# Patient Record
Sex: MALE | Race: WHITE | Employment: PART TIME | URBAN - METROPOLITAN AREA
[De-identification: names, ages, dates, MRNs, and addresses within clinical notes are randomized per-mention and may not be internally consistent; named-entity substitution may affect disease eponyms.]

---

## 2021-10-25 ENCOUNTER — OFFICE VISIT - RIVER FALLS (OUTPATIENT)
Dept: FAMILY MEDICINE | Facility: CLINIC | Age: 26
End: 2021-10-25

## 2021-10-25 ASSESSMENT — MIFFLIN-ST. JEOR: SCORE: 1723.35

## 2021-10-27 ENCOUNTER — COMMUNICATION - RIVER FALLS (OUTPATIENT)
Dept: FAMILY MEDICINE | Facility: CLINIC | Age: 26
End: 2021-10-27

## 2021-10-27 LAB
6-ACETYL MORPHINE - HISTORICAL: NEGATIVE NG/ML
AMPHETAMINE - HISTORICAL: 1680 NG/ML
AMPHETAMINE URINE - HISTORICAL: ABNORMAL
AMPHETAMINES UR QL: POSITIVE NG/ML
BENZODIAZ UR QL SCN: NEGATIVE NG/ML
BUPRENORPHINE: NEGATIVE NG/ML
CANNABINOIDS QUAL UR: 35 NG/ML
COCAINE METAB URINE - HISTORICAL: NEGATIVE NG/ML
CREAT UR-MCNC: 61.6 MG/DL
ETHYL GLUCURONIDE UR QL CFM: NEGATIVE NG/ML
MDMA: NEGATIVE NG/ML
METHAMPHETAMINE,URINE - HISTORICAL: NEGATIVE NG/ML
OPIATES UR QL SCN: NEGATIVE NG/ML
OXIDANTS URINE: NEGATIVE MCG/ML
OXYCODONE URINE - HISTORICAL: NEGATIVE NG/ML
PH UR STRIP: 5.2 [PH] (ref 4.5–9)
THC 50 URINE - HISTORICAL: POSITIVE NG/ML

## 2021-10-28 ENCOUNTER — COMMUNICATION - RIVER FALLS (OUTPATIENT)
Dept: FAMILY MEDICINE | Facility: CLINIC | Age: 26
End: 2021-10-28

## 2021-11-04 ENCOUNTER — VIRTUAL VISIT (OUTPATIENT)
Dept: BEHAVIORAL HEALTH | Facility: TELEHEALTH | Age: 26
End: 2021-11-04
Payer: COMMERCIAL

## 2021-11-04 DIAGNOSIS — F32.A DEPRESSION, UNSPECIFIED DEPRESSION TYPE: Primary | ICD-10-CM

## 2021-11-04 PROCEDURE — 90832 PSYTX W PT 30 MINUTES: CPT | Mod: 95 | Performed by: SOCIAL WORKER

## 2021-11-04 ASSESSMENT — ANXIETY QUESTIONNAIRES
7. FEELING AFRAID AS IF SOMETHING AWFUL MIGHT HAPPEN: SEVERAL DAYS
GAD7 TOTAL SCORE: 10
2. NOT BEING ABLE TO STOP OR CONTROL WORRYING: SEVERAL DAYS
1. FEELING NERVOUS, ANXIOUS, OR ON EDGE: MORE THAN HALF THE DAYS
3. WORRYING TOO MUCH ABOUT DIFFERENT THINGS: MORE THAN HALF THE DAYS
GAD7 TOTAL SCORE: 10
6. BECOMING EASILY ANNOYED OR IRRITABLE: SEVERAL DAYS
GAD7 TOTAL SCORE: 10
7. FEELING AFRAID AS IF SOMETHING AWFUL MIGHT HAPPEN: SEVERAL DAYS
4. TROUBLE RELAXING: MORE THAN HALF THE DAYS
8. IF YOU CHECKED OFF ANY PROBLEMS, HOW DIFFICULT HAVE THESE MADE IT FOR YOU TO DO YOUR WORK, TAKE CARE OF THINGS AT HOME, OR GET ALONG WITH OTHER PEOPLE?: SOMEWHAT DIFFICULT
5. BEING SO RESTLESS THAT IT IS HARD TO SIT STILL: SEVERAL DAYS

## 2021-11-04 ASSESSMENT — PATIENT HEALTH QUESTIONNAIRE - PHQ9
SUM OF ALL RESPONSES TO PHQ QUESTIONS 1-9: 19
10. IF YOU CHECKED OFF ANY PROBLEMS, HOW DIFFICULT HAVE THESE PROBLEMS MADE IT FOR YOU TO DO YOUR WORK, TAKE CARE OF THINGS AT HOME, OR GET ALONG WITH OTHER PEOPLE: VERY DIFFICULT
SUM OF ALL RESPONSES TO PHQ QUESTIONS 1-9: 19

## 2021-11-05 ASSESSMENT — ANXIETY QUESTIONNAIRES: GAD7 TOTAL SCORE: 10

## 2021-11-05 ASSESSMENT — PATIENT HEALTH QUESTIONNAIRE - PHQ9: SUM OF ALL RESPONSES TO PHQ QUESTIONS 1-9: 19

## 2021-11-05 NOTE — PROGRESS NOTES
[facility]: : Integrated Behavioral Health  November 5, 2021    Telemedicine Visit: The patient's condition can be safely assessed and treated via synchronous audio and visual telemedicine encounter.     Reason for Telemedicine Visit: Services only offered telehealth    Originating Site (Patient Location): Patient's home    Distant Site (Provider Location): Provider Remote Setting- Home Office    Consent:  The patient/guardian has verbally consented to: the potential risks and benefits of telemedicine (video visit) versus in person care; bill my insurance or make self-payment for services provided; and responsibility for payment of non-covered services.     Mode of Communication:  Video Conference via [employer]    As the provider I attest to compliance with applicable laws and regulations related to telemedicine.      Behavioral Health Clinician Progress Note    Patient Name: Regino Goldstein           Service Type:  Individual      Service Location:   Face to Face in Home / Community via [employer]     Session Start Time: 8:31am  Session End Time: 8:58am      Session Length: 16 - 37      Attendees: Patient    Visit Activities (Refresh list every visit): [facility] and [facility] Only    Diagnostic Assessment Date: Next Session  Treatment Plan Review Date: NA  See Flowsheets for today's PHQ-9 and NIKO-7 results  Previous PHQ-9:   PHQ-9 SCORE 11/4/2021   PHQ-9 Total Score MyChart 19 (Moderately severe depression)   PHQ-9 Total Score 19     Previous NIKO-7:   NIKO-7 SCORE 11/4/2021   Total Score 10 (moderate anxiety)   Total Score 10       GIL LEVEL:  No flowsheet data found.    DATA  Extended Session (60+ minutes): No  Interactive Complexity: No  Crisis: No  [facility] Patient: No    Treatment Objective(s) Addressed in This Session:  Target Behavior(s): disease management/lifestyle changes related to mental health    Depressed Mood: Increase interest, engagement, and pleasure in doing  "things  Decrease frequency and intensity of feeling down, depressed, hopeless  Improve quantity and quality of night time sleep / decrease daytime naps  Feel less tired and more energy during the day   Improve diet, appetite, mindful eating, and / or meal planning  Identify negative self-talk and behaviors: challenge core beliefs, myths, and actions  Improve concentration, focus, and mindfulness in daily activities   Feel less fidgety, restless or slow in daily activities / interpersonal interactions  Decrease thoughts that you'd be better off dead or of suicide / self-harm  Anxiety: will experience a reduction in anxiety, will develop more effective coping skills to manage anxiety symptoms, will develop healthy cognitive patterns and beliefs and will increase ability to function adaptively  Adjustment Difficulties: will develop coping/problem-solving skills to facilitate more adaptive adjustment    Current Stressors / Issues:  [facility] introduced self and role to patient. Patient reported that he has \"always been depressed\" that stems from anxiety that often spirals. Patient shared that his symptoms were really bad in college where he wasn't taking care of himself and wouldn't go to class. He noted having social anxiety starting in 4th grade. This past July he decided to quit his job of 3 years and move back home after living in [place] for many years. 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 Patient reported that since he has made the move home he has noticed that he is sleeping more than normal and it is hard to get motivated. Patient reported that he has experienced passive SI lots of his life with no plan or intent and no " history of plan or intent. Patient is not concerned with his thoughts, as he would never do anything. C and patient spent session processing through his symptoms and the changes. [facility] encouraged patient to try to get back into a routine even though he doesn't have day to day responsibilities.       Progress on Treatment Objective(s) / Homework:  New Objective established this session - PREPARATION (Decided to change - considering how); Intervened by negotiating a change plan and determining options / strategies for behavior change, identifying triggers, exploring social supports, and working towards setting a date to begin behavior change    Motivational Interviewing    MI Intervention: Expressed Empathy/Understanding, Supported Autonomy, Collaboration, Evocation, Permission to raise concern or advise, Open-ended questions and Reflections: simple and complex     Change Talk Expressed by the Patient: Desire to change Ability to change Reasons to change Need to change Committment to change Activation Taking steps    Provider Response to Change Talk: E - Evoked more info from patient about behavior change, A - Affirmed patient's thoughts, decisions, or attempts at behavior change, R - Reflected patient's change talk and S - Summarized patient's change talk statements    Also provided psychoeducation about behavioral health condition, symptoms, and treatment options    Care Plan review completed: Yes    Medication Review:  No changes to current psychiatric medication(s)    Medication Compliance:  Yes    Changes in Health Issues:   None reported    Chemical Use Review:   Substance Use: Chemical use reviewed, no active concerns identified      Tobacco Use: No current tobacco use.      Assessment: Current Emotional / Mental Status (status of significant symptoms):  Risk status (Self / Other harm or suicidal ideation)  Patient has had a history of suicidal ideation: fleeting, passive SI.  Patient denies current fears or  concerns for personal safety.  Patient denies current or recent suicidal ideation or behaviors.  Patient denies current or recent homicidal ideation or behaviors.  Patient denies current or recent self injurious behavior or ideation.  Patient denies other safety concerns.  A safety and risk management plan has not been developed at this time, however patient was encouraged to call [place] [phone] should there be a change in any of these risk factors.    Appearance:   Appropriate   Eye Contact:   Good   Psychomotor Behavior: Normal   Attitude:   Cooperative   Orientation:   All  Speech   Rate / Production: Normal    Volume:  Normal   Mood:    Normal  Affect:    Appropriate   Thought Content:  Clear   Thought Form:  Coherent  Logical   Insight:    Good     Diagnoses:  1. Depression, unspecified depression type        Collateral Reports Completed:  Routed note to PCP    Plan: (Homework, other):  Patient was given information about behavioral services and encouraged to schedule a follow up appointment with the clinic [facility] as needed.  He was also given information about mental health symptoms and treatment options .  CD Recommendations: No indications of CD issues. DA to be completed at next session. LIZZETTE Taylor, [facility]      ______________________________________________________________________

## 2021-11-16 ENCOUNTER — VIRTUAL VISIT (OUTPATIENT)
Dept: BEHAVIORAL HEALTH | Facility: TELEHEALTH | Age: 26
End: 2021-11-16
Payer: COMMERCIAL

## 2021-11-16 DIAGNOSIS — F33.1 MODERATE EPISODE OF RECURRENT MAJOR DEPRESSIVE DISORDER (H): Primary | ICD-10-CM

## 2021-11-16 DIAGNOSIS — F41.1 GENERALIZED ANXIETY DISORDER: ICD-10-CM

## 2021-11-16 PROCEDURE — 90791 PSYCH DIAGNOSTIC EVALUATION: CPT | Mod: 95 | Performed by: SOCIAL WORKER

## 2021-11-16 ASSESSMENT — COLUMBIA-SUICIDE SEVERITY RATING SCALE - C-SSRS
5. HAVE YOU STARTED TO WORK OUT OR WORKED OUT THE DETAILS OF HOW TO KILL YOURSELF? DO YOU INTEND TO CARRY OUT THIS PLAN?: NO
6. HAVE YOU EVER DONE ANYTHING, STARTED TO DO ANYTHING, OR PREPARED TO DO ANYTHING TO END YOUR LIFE?: NO
4. HAVE YOU HAD THESE THOUGHTS AND HAD SOME INTENTION OF ACTING ON THEM?: NO
4. HAVE YOU HAD THESE THOUGHTS AND HAD SOME INTENTION OF ACTING ON THEM?: NO
ATTEMPT LIFETIME: NO
6. HAVE YOU EVER DONE ANYTHING, STARTED TO DO ANYTHING, OR PREPARED TO DO ANYTHING TO END YOUR LIFE?: NO
2. HAVE YOU ACTUALLY HAD ANY THOUGHTS OF KILLING YOURSELF?: NO
ATTEMPT PAST THREE MONTHS: NO
TOTAL  NUMBER OF INTERRUPTED ATTEMPTS PAST 3 MONTHS: NO
TOTAL  NUMBER OF ABORTED OR SELF INTERRUPTED ATTEMPTS PAST LIFETIME: NO
1. IN THE PAST MONTH, HAVE YOU WISHED YOU WERE DEAD OR WISHED YOU COULD GO TO SLEEP AND NOT WAKE UP?: YES
TOTAL  NUMBER OF ABORTED OR SELF INTERRUPTED ATTEMPTS PAST 3 MONTHS: NO
5. HAVE YOU STARTED TO WORK OUT OR WORKED OUT THE DETAILS OF HOW TO KILL YOURSELF? DO YOU INTEND TO CARRY OUT THIS PLAN?: NO
1. IN THE PAST MONTH, HAVE YOU WISHED YOU WERE DEAD OR WISHED YOU COULD GO TO SLEEP AND NOT WAKE UP?: YES
3. HAVE YOU BEEN THINKING ABOUT HOW YOU MIGHT KILL YOURSELF?: YES
TOTAL  NUMBER OF INTERRUPTED ATTEMPTS LIFETIME: NO
2. HAVE YOU ACTUALLY HAD ANY THOUGHTS OF KILLING YOURSELF LIFETIME?: YES
REASONS FOR IDEATION PAST MONTH: MOSTLY TO END OR STOP THE PAIN (YOU COULDN'T GO ON LIVING WITH THE PAIN OR HOW YOU WERE FEELING)
REASONS FOR IDEATION LIFETIME: MOSTLY TO END OR STOP THE PAIN (YOU COULDN'T GO ON LIVING WITH THE PAIN OR HOW YOU WERE FEELING)
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
## 2021-11-30 ENCOUNTER — THERAPY VISIT (OUTPATIENT)
Dept: BEHAVIORAL HEALTH | Facility: TELEHEALTH | Age: 26
End: 2021-11-30
Payer: COMMERCIAL

## 2021-11-30 DIAGNOSIS — F33.1 MODERATE EPISODE OF RECURRENT MAJOR DEPRESSIVE DISORDER (H): ICD-10-CM

## 2021-11-30 DIAGNOSIS — F41.1 GENERALIZED ANXIETY DISORDER: ICD-10-CM

## 2021-11-30 PROCEDURE — 90834 PSYTX W PT 45 MINUTES: CPT | Performed by: MARRIAGE & FAMILY THERAPIST

## 2021-11-30 NOTE — PROGRESS NOTES
Progress Note    Patient Name: Regino Goldstein  Date: 11/30/2021         Service Type: Individual      Session Start Time: 11 AM   Session End Time: 11:50 AM     Session Length: 50 minutes    Session #: 3    Attendees: Client    Service Modality:  In-person     Treatment Plan Last Reviewed: 11/30/2021  PHQ-9 / NIKO-7 : Recent scores 19/10    DATA  Interactive Complexity: No  Crisis: No       Progress Since Last Session (Related to Symptoms / Goals / Homework):   Symptoms: No change First session with this provider    Homework: NA      Episode of Care Goals: No improvement - PREPARATION (Decided to change - considering how); Intervened by negotiating a change plan and determining options / strategies for behavior change, identifying triggers, exploring social supports, and working towards setting a date to begin behavior change     Current / Ongoing Stressors and Concerns:         New visit between provider and this client. He discussed his anxiety as daily, depressive symtoms daily, and a long history of ADHD, Combined Type. Goals were discussed below. He provided a brief summar of background information leading to his referral for behavioral health. He is taking Adderrall and has discontinued Lexapro. He is reporting less suicidal thoughts, and had not had any since his visit with Ladonna. He discussed liking logical approaches, so CBT was introduced. He was provided handout for Depression Treatment with CBT from Vincent rosas. He is assigned to complete a nightly success list, and to use a mood log when having any depressive or anxious symptom that troubles him. Weekly visits were agreed upon.       Treatment Objective(s) Addressed in This Session:   use thought-stopping strategy daily to reduce intrusive thoughts  Replace negative interpretations of self/others/world and replace with rational responses.     Intervention:   CBT:    use thought-stopping  strategy daily to reduce intrusive thoughts  Replace negative interpretations of self/others/world and replace with rational responses.          ASSESSMENT: Current Emotional / Mental Status (status of significant symptoms):   Risk status (Self / Other harm or suicidal ideation)   Patient denies current fears or concerns for personal safety.   Patient denies current or recent suicidal ideation or behaviors.   Patient denies current or recent homicidal ideation or behaviors.   Patient denies current or recent self injurious behavior or ideation.   Patient denies other safety concerns.   Patient reports there has been no change in risk factors since their last session.     Patient reports there has been no change in protective factors since their last session.     Recommended that patient call 911 or go to the local ED should there be a change in any of these risk factors.     Appearance:   Appropriate    Eye Contact:   Good    Psychomotor Behavior: Normal    Attitude:   Cooperative  Friendly   Orientation:   All   Speech    Rate / Production: Normal     Volume:  Normal    Mood:    Anxious  Depressed    Affect:    Appropriate    Thought Content:  Clear    Thought Form:  Coherent  Logical    Insight:    Good      Medication Review:   No changes to current psychiatric medication(s)     Medication Compliance:   Yes     Changes in Health Issues:   None reported     Chemical Use Review:   Substance Use: Chemical use reviewed, no active concerns identified      Tobacco Use: No current tobacco use.      Diagnosis:  1. Generalized anxiety disorder    2. Moderate episode of recurrent major depressive disorder (H)        Collateral Reports Completed:   Not Applicable    PLAN: (Patient Tasks / Therapist Tasks / Other)  Client will complete success list nightly  Client will use mood log during anxious or depressive symptoms.        CINTHIA Smith                                                          ______________________________________________________________________    Treatment Plan    Patient's Name: Regino Goldstein  YOB: 1995    Date: 11/30/2021    DSM5 Diagnoses: 300.02 (F41.1) Generalized Anxiety Disorder ; Major Depressive Disorder, Recurrent, Moderate, ADHD Combined    Psychosocial / Contextual Factors: living with parents, learning a new job, history of depression, ADHD   WHODAS: See file screening    Referral / Collaboration:  Referral to another professional/service is not indicated at this time..    Anticipated number of session or this episode of care: 20      MeasurableTreatment Goal(s) related to diagnosis / functional impairment(s)  Goal 1: Patient will Develop healthy cognitive patterns and beliefs about self and world that lead to alleviation and help prevent recurrance of anxiety and depression.    I will know I've met my goal when I am happier.      Objective #A (Patient Action)    Patient will use thought-stopping strategy daily to reduce intrusive thoughts.  Status: New - Date: 11/30/2021     Intervention(s)  Therapist will teach Cognitive behavioral techniques and mindfulness strategies to disrutp anxios patterns.    Objective #B  Patient will Decrease frequency and intensity of feeling down, depressed, hopeless.  Status: New - Date: 11/30/2021     Intervention(s)  Therapist will teach   teach Cognitive behavioral techniques and mindfulness strategies to disrutp anxios patterns        Patient has reviewed and agreed to the above plan.      CINTHIA Smith  November 30, 2021

## 2021-12-08 ENCOUNTER — OFFICE VISIT (OUTPATIENT)
Dept: BEHAVIORAL HEALTH | Facility: TELEHEALTH | Age: 26
End: 2021-12-08
Payer: COMMERCIAL

## 2021-12-08 DIAGNOSIS — F41.1 GENERALIZED ANXIETY DISORDER: ICD-10-CM

## 2021-12-08 DIAGNOSIS — F90.9 ATTENTION DEFICIT HYPERACTIVITY DISORDER (ADHD), UNSPECIFIED ADHD TYPE: ICD-10-CM

## 2021-12-08 DIAGNOSIS — F33.1 MODERATE EPISODE OF RECURRENT MAJOR DEPRESSIVE DISORDER (H): Primary | ICD-10-CM

## 2021-12-08 PROCEDURE — 90834 PSYTX W PT 45 MINUTES: CPT | Performed by: MARRIAGE & FAMILY THERAPIST

## 2021-12-08 NOTE — PROGRESS NOTES
"                                             Progress Note    Patient Name: Regino Goldstein  Date: 12/8/2021         Service Type: Individual      Session Start Time: 9:05 AM  session End Time: 9:55 AM     Session Length: 50 minutes    Session #: 2    Attendees: Client    Service Modality:  In-person     Treatment Plan Last Reviewed: 12/8/2021  PHQ-9 / NIKO-7 :   PHQ-9 score:    PHQ 11/4/2021   PHQ-9 Total Score 19   Q9: Thoughts of better off dead/self-harm past 2 weeks Several days   F/U: Thoughts of suicide or self-harm Yes   F/U: Self harm-plan No   F/U: Self-harm action No   F/U: Safety concerns No       NIKO-7 SCORE 11/4/2021   Total Score 10 (moderate anxiety)   Total Score 10       DATA  Interactive Complexity: No  Crisis: No       Progress Since Last Session (Related to Symptoms / Goals / Homework):   Symptoms: Improving Client reported no suicidal ideation over the past week    Homework: No homework as assigned from the assessment      Episode of Care Goals: Minimal progress - PREPARATION (Decided to change - considering how); Intervened by negotiating a change plan and determining options / strategies for behavior change, identifying triggers, exploring social supports, and working towards setting a date to begin behavior change     Current / Ongoing Stressors and Concerns:   Client discussed having no suicidal ideation over the past week.  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  He discussed feeling excluded and disconnected from multiple social groups due to race and other factors.  Client states \"I cannot connect.\"  Therapist utilized solution oriented interventions to highlight that a keyword would be yet.  He responded favorably to this.  He then discussed conflict with family over them sending solutions for their thoughts of his career goals.  He states that he sacrificed independence and autonomy by moving back to his family " residence.  He stated that he is highly aware of other peoples failure to accept or acknowledge race.  Conflict with father includes messages of not mattering to others.  This was pursued further to highlight cognitive distortions and process rational responses, that matters to the client what he does and where he goes so as to feel respected and the experience of wanting to invest himself within the community.     Treatment Objective(s) Addressed in This Session:   use cognitive strategies identified in therapy to challenge anxious thoughts  Increase interest, engagement, and pleasure in doing things  Decrease frequency and intensity of feeling down, depressed, hopeless  Identify negative self-talk and behaviors: challenge core beliefs, myths, and actions  Feel less fidgety, restless or slow in daily activities / interpersonal interactions  Decrease thoughts that you'd be better off dead or of suicide / self-harm     Intervention:   CBT: Distortion identification and replacement  Solution Focused: Focus on solutions that are self identified        ASSESSMENT: Current Emotional / Mental Status (status of significant symptoms):   Risk status (Self / Other harm or suicidal ideation)   Patient denies current fears or concerns for personal safety.   Patient reports the following current or recent suicidal ideation or behaviors: More than a week ago he had daily suicidal thoughts.  He states that during this week he has not had any suicidal thoughts..   Patient denies current or recent homicidal ideation or behaviors.   Patient denies current or recent self injurious behavior or ideation.   Patient denies other safety concerns.   Patient reports there has been no change in risk factors since their last session.     Patient reports there has been no change in protective factors since their last session.     Recommended that patient call 911 or go to the local ED should there be a change in any of these risk  factors.     Appearance:   Appropriate    Eye Contact:   Good    Psychomotor Behavior: Normal    Attitude:   Cooperative  Friendly   Orientation:   All   Speech    Rate / Production: Normal     Volume:  Normal    Mood:    Anxious  Depressed    Affect:    Appropriate    Thought Content:  Clear    Thought Form:  Coherent  Logical    Insight:    Good      Medication Review:   No changes to current psychiatric medication(s) Adderall XR, and Adderall immediate release     Medication Compliance:   Yes     Changes in Health Issues:   None reported     Chemical Use Review:   Substance Use: Chemical use reviewed, no active concerns identified      Tobacco Use: No current tobacco use.      Diagnosis:  1. Moderate episode of recurrent major depressive disorder (H)    2. Generalized anxiety disorder    3. Attention deficit hyperactivity disorder (ADHD), unspecified ADHD type        Collateral Reports Completed:   Not Applicable    PLAN: (Patient Tasks / Therapist Tasks / Other)  Weekly individual psychotherapy was preferred by the client.  Client was asked to consider requesting or shaping conversations towards kindness.  Client will also work towards completing college entrance application.        Kade Johnson, CINTHIA                                                         ______________________________________________________________________    Treatment Plan    Patient's Name: Regino Goldstein  YOB: 1995    Date: 12/8/2021    DSM5 Diagnoses: Attention-Deficit/Hyperactivity Disorder  314.01 (F90.9) Unspecified Attention -Deficit / Hyperactivity Disorder, 296.32 (F33.1) Major Depressive Disorder, Recurrent Episode, Moderate _ and With mixed features or 300.02 (F41.1) Generalized Anxiety Disorder  Psychosocial / Contextual Factors: Living in his parent's home, wanting to return to college for medicine, complications to his romantic relationship, community racism  WHODAS:   WHODAS 2.0 Total Score 11/4/2021    Total Score 20   Total Score MyChart 20       Referral / Collaboration:  Referral to another professional/service is not indicated at this time..    Anticipated number of session or this episode of care: 25      MeasurableTreatment Goal(s) related to diagnosis / functional impairment(s)  Goal 1: Patient will Improve mood and extinguish suicidal ideation.    I will know I've met my goal when I feel less depressed..      Objective #A (Patient Action)    Patient will Increase interest, engagement, and pleasure in doing things  Decrease frequency and intensity of feeling down, depressed, hopeless  Identify negative self-talk and behaviors: challenge core beliefs, myths, and actions  Decrease thoughts that you'd be better off dead or of suicide / self-harm.  Status: New - Date: 12/8/2021     Intervention(s)  Therapist will teach Cognitive behavioral techniques and strategies to untwist thinking errors so as to improve mood.      Goal 2: Patient will increase his ability to feel relaxed and at ease    I will know I've met my goal when I stop worrying so much.      Objective #A (Patient Action)    Status: New - Date: 12/8/2021     Patient will use relaxation strategies 1-3  times per day to reduce the physical symptoms of anxiety.    Intervention(s)  Therapist will teach Relaxation strategies.      Goal 3: Patiient will improve ability to focus nd ocmplete tasks.    I will know I've met my goal when (answer not given by client).     Objective #A (Patient Action)    Status: New - Date: 12/8/2021     Patient will identify and use Focus and organizational strategies to improve organization.    Intervention(s)  Therapist will Utilize executive functioning strategies.        Patient has not reviewed nor agreed to the above plan.      CINTHIA Smith  December 8, 2021

## 2021-12-12 ENCOUNTER — HEALTH MAINTENANCE LETTER (OUTPATIENT)
Age: 26
End: 2021-12-12

## 2021-12-15 ENCOUNTER — OFFICE VISIT (OUTPATIENT)
Dept: BEHAVIORAL HEALTH | Facility: TELEHEALTH | Age: 26
End: 2021-12-15
Payer: COMMERCIAL

## 2021-12-15 DIAGNOSIS — F33.1 MODERATE EPISODE OF RECURRENT MAJOR DEPRESSIVE DISORDER (H): Primary | ICD-10-CM

## 2021-12-15 DIAGNOSIS — F41.1 GENERALIZED ANXIETY DISORDER: ICD-10-CM

## 2021-12-15 DIAGNOSIS — F90.9 ATTENTION DEFICIT HYPERACTIVITY DISORDER (ADHD), UNSPECIFIED ADHD TYPE: ICD-10-CM

## 2021-12-15 PROCEDURE — 90834 PSYTX W PT 45 MINUTES: CPT | Performed by: MARRIAGE & FAMILY THERAPIST

## 2021-12-15 NOTE — PROGRESS NOTES
Progress Note    Patient Name: Regino Goldstein  Date: 12/15/2021         Service Type: Individual      Session Start Time: 9 AM  session End Time: 9:55 AM     Session Length: 50 minutes    Session #: 3    Attendees: Client     Service Modality:  In-person     Treatment Plan Last Reviewed: 12/8/2021  PHQ-9 / NIKO-7 :   PHQ-9 score:    PHQ 11/4/2021   PHQ-9 Total Score 19   Q9: Thoughts of better off dead/self-harm past 2 weeks Several days   F/U: Thoughts of suicide or self-harm Yes   F/U: Self harm-plan No   F/U: Self-harm action No   F/U: Safety concerns No       NIKO-7 SCORE 11/4/2021   Total Score 10 (moderate anxiety)   Total Score 10       DATA  Interactive Complexity: No  Crisis: No       Progress Since Last Session (Related to Symptoms / Goals / Homework):   Symptoms: Improving Client reported no suicidal ideation over the past week    Homework:  Partial completion      Episode of Care Goals: Minimal progress - PREPARATION (Decided to change - considering how); Intervened by negotiating a change plan and determining options / strategies for behavior change, identifying triggers, exploring social supports, and working towards setting a date to begin behavior change     Current / Ongoing Stressors and Concerns:  The client discussed another week without suicidal ideation. He stated that he had been working so much, he had little time to achieve his goals. He was encouraged to consider making lists on a regular basis and celebrate his efforts rather than achievements. He responded favorably. He stated he has been missing his friends from [employer], including his romantic interest Tonja. Therapist processed his longing, and feelings of being in a place ([facility]) when he really did not want to move back to his family home. Therapist encouraged exercise (walking) to help build task initiation energy. He then discussed his confusion about what direction to pursue  next: Medical school, shadowing, or finding a job as Medical Technician. Therapist encouraged keeping track of tasks for each, and to pursue different components/steps and to reward himself for efforts taken (rather than focusing on outcome only).      Treatment Objective(s) Addressed in This Session:   use cognitive strategies identified in therapy to challenge anxious thoughts  Increase interest, engagement, and pleasure in doing things  Decrease frequency and intensity of feeling down, depressed, hopeless  Identify negative self-talk and behaviors: challenge core beliefs, myths, and actions  Feel less fidgety, restless or slow in daily activities / interpersonal interactions  Decrease thoughts that you'd be better off dead or of suicide / self-harm     Intervention:   CBT: Distortion identification and replacement  Solution Focused: Focus on solutions that are self identified        ASSESSMENT: Current Emotional / Mental Status (status of significant symptoms):   Risk status (Self / Other harm or suicidal ideation)   Patient denies current fears or concerns for personal safety.   Patient reports the following current or recent suicidal ideation or behaviors: More than a week ago he had daily suicidal thoughts.  He states that during this week he has not had any suicidal thoughts..   Patient denies current or recent homicidal ideation or behaviors.   Patient denies current or recent self injurious behavior or ideation.   Patient denies other safety concerns.   Patient reports there has been no change in risk factors since their last session.     Patient reports there has been no change in protective factors since their last session.     Recommended that patient call 911 or go to the local ED should there be a change in any of these risk factors.     Appearance:   Appropriate    Eye Contact:   Good    Psychomotor Behavior: Normal    Attitude:   Cooperative  Friendly   Orientation:   All   Speech    Rate /  Production: Normal     Volume:  Normal    Mood:    Anxious  Depressed    Affect:    Appropriate    Thought Content:  Clear    Thought Form:  Coherent  Logical    Insight:    Good      Medication Review:   No changes to current psychiatric medication(s) Adderall XR, and Adderall immediate release     Medication Compliance:   Yes     Changes in Health Issues:   None reported     Chemical Use Review:   Substance Use: Chemical use reviewed, no active concerns identified      Tobacco Use: No current tobacco use.      Diagnosis:  1. Moderate episode of recurrent major depressive disorder (H)    2. Generalized anxiety disorder    3. Attention deficit hyperactivity disorder (ADHD), unspecified ADHD type        Collateral Reports Completed:   Not Applicable    PLAN: (Patient Tasks / Therapist Tasks / Other)  Weekly individual psychotherapy was preferred by the client.  Client was asked to keep a list of tasks for completion (Med school, med tech, shadowing, etc.)  Client was asked to challenge inner critic and use of self to incorporate internal wisdom.       Kade Johnson, LMFT  12/15/2021                                                           ______________________________________________________________________    Treatment Plan    Patient's Name: Regino Goldstein  YOB: 1995    Date: 12/15/2021    DSM5 Diagnoses: Attention-Deficit/Hyperactivity Disorder  314.01 (F90.9) Unspecified Attention -Deficit / Hyperactivity Disorder, 296.32 (F33.1) Major Depressive Disorder, Recurrent Episode, Moderate _ and With mixed features or 300.02 (F41.1) Generalized Anxiety Disorder  Psychosocial / Contextual Factors: Living in his parent's home, wanting to return to college for medicine, complications to his romantic relationship, community racism  WHODAS:   WHODAS 2.0 Total Score 11/4/2021   Total Score 20   Total Score MyChart 20       Referral / Collaboration:  Referral to another professional/service is not  indicated at this time..    Anticipated number of session or this episode of care: 25      MeasurableTreatment Goal(s) related to diagnosis / functional impairment(s)  Goal 1: Patient will Improve mood and extinguish suicidal ideation.    I will know I've met my goal when I feel less depressed..      Objective #A (Patient Action)    Patient will Increase interest, engagement, and pleasure in doing things  Decrease frequency and intensity of feeling down, depressed, hopeless  Identify negative self-talk and behaviors: challenge core beliefs, myths, and actions  Decrease thoughts that you'd be better off dead or of suicide / self-harm.  Status: New - Date: 12/8/2021     Intervention(s)  Therapist will teach Cognitive behavioral techniques and strategies to untwist thinking errors so as to improve mood.      Goal 2: Patient will increase his ability to feel relaxed and at ease    I will know I've met my goal when I stop worrying so much.      Objective #A (Patient Action)    Status: New - Date: 12/8/2021     Patient will use relaxation strategies 1-3  times per day to reduce the physical symptoms of anxiety.    Intervention(s)  Therapist will teach Relaxation strategies.      Goal 3: Patiient will improve ability to focus nd ocmplete tasks.    I will know I've met my goal when (answer not given by client).     Objective #A (Patient Action)    Status: New - Date: 12/8/2021     Patient will identify and use Focus and organizational strategies to improve organization.    Intervention(s)  Therapist will Utilize executive functioning strategies.        Patient has not reviewed nor agreed to the above plan.      CINTHIA Smith  December 15, 2021

## 2022-01-03 ENCOUNTER — OFFICE VISIT - RIVER FALLS (OUTPATIENT)
Dept: FAMILY MEDICINE | Facility: CLINIC | Age: 27
End: 2022-01-03

## 2022-01-17 ENCOUNTER — OFFICE VISIT - RIVER FALLS (OUTPATIENT)
Dept: FAMILY MEDICINE | Facility: CLINIC | Age: 27
End: 2022-01-17

## 2022-01-20 LAB
AMPHETAMINE - HISTORICAL: 3331 NG/ML
AMPHETAMINE URINE - HISTORICAL: ABNORMAL
AMPHETAMINE URINE - HISTORICAL: ABNORMAL
AMPHETAMINES UR QL: POSITIVE NG/ML
BARBITURATES UR QL SCN: NEGATIVE NG/ML
BENZODIAZ UR QL SCN: NEGATIVE NG/ML
COCAINE METAB URINE - HISTORICAL: NEGATIVE NG/ML
CREAT UR-MCNC: 197.8 MG/DL
METHADONE URINE - HISTORICAL: NEGATIVE NG/ML
METHAMPHETAMINE,URINE - HISTORICAL: NEGATIVE NG/ML
OPIATES UR QL SCN: NEGATIVE NG/ML
OXIDANTS URINE: NEGATIVE MCG/ML
OXYCODONE URINE - HISTORICAL: NEGATIVE NG/ML
PCP UR QL SCN: NEGATIVE NG/ML
PH UR STRIP: 5.5 [PH] (ref 4.5–9)
THC 50 URINE - HISTORICAL: NEGATIVE NG/ML

## 2022-01-24 ENCOUNTER — COMMUNICATION - RIVER FALLS (OUTPATIENT)
Dept: FAMILY MEDICINE | Facility: CLINIC | Age: 27
End: 2022-01-24

## 2022-02-11 VITALS
HEART RATE: 61 BPM | DIASTOLIC BLOOD PRESSURE: 64 MMHG | OXYGEN SATURATION: 98 % | TEMPERATURE: 97.7 F | BODY MASS INDEX: 25.69 KG/M2 | HEIGHT: 68 IN | WEIGHT: 169.5 LBS | SYSTOLIC BLOOD PRESSURE: 110 MMHG

## 2022-02-15 NOTE — TELEPHONE ENCOUNTER
---------------------  From: Josseline Sam   Cc: LIZZETTE Rodriguez Courtney;      Sent: 10/25/2021 11:51:00 AM CDT  Subject: behavioral health counseling      Pt would like to be set up for [facility] with Ladonna Gibbs per VANESSA. I faxed order to [facility] [phone] for them to call pt to schedule.---------------------  From: LIZZETTE Rodriguez Courtney   To: Josseline Sam;     Sent: 10/26/2021 2:11:36 PM CDT  Subject: RE: behavioral health counseling      Thank you for letting me know! I will keep my eye out for them to get scheduled.

## 2022-02-15 NOTE — NURSING NOTE
Depression Screening Entered On:  12/17/2021 2:25 PM CST    Performed On:  10/25/2021 2:19 PM CDT by Donna Sevilla               Depression Screening   Little Interest - Pleasure in Activities :   Nearly every day   Feeling Down, Depressed, Hopeless :   Nearly every day   Initial Depression Screen Score :   6 Score   Poor Appetite or Overeating :   More than half the days   Trouble Falling or Staying Asleep :   Nearly every day   Feeling Tired or Little Energy :   Nearly every day   Feeling Bad About Yourself :   More than half the days   Trouble Concentrating :   More than half the days   Moving or Speaking Slowly :   Not at all   Thoughts Better Off Dead or Hurting Self :   Several days   Difficulty at Work, Home, Getting Along :   Very difficult   Detailed Depression Screen Score :   13    Total Depression Screen Score :   19    Donna Sevilla - 12/17/2021 2:19 PM CST

## 2022-02-15 NOTE — LETTER
(Inserted Image. Unable to display)   [address] [place] [address]  October 27, 2021      JOSHUA MILLAN      [place] [address]        Dear JOSHUA,    Thank you for selecting [facility] for your healthcare needs.  Below you will find the results of your recent tests done at our clinic.     As you mentioned, a small amount of marijuana present      Result Name Current Result Reference Range   U Cannabis Metabolites (ng/mL) ((H)) 35 10/25/2021  - <5       Please contact me or my assistant at [phone] if you have any questions about your results.     Sincerely,        Rome Palma MD        What do your labs mean?  Below is a glossary of commonly ordered labs:  LDL   Bad Cholesterol   HDL   Good Cholesterol  AST/ALT   Liver Function   Cr/Creatinine   Kidney Function  Microalbumin   Kidney Function  BUN   Kidney Function  PSA   Prostate    TSH   Thyroid Hormone  HgbA1c   Diabetes Test   Hgb (Hemoglobin)   Red Blood Cells

## 2022-02-15 NOTE — PROGRESS NOTES
Patient:   JOSHUA MILLAN            MRN: [id number]            FIN: [id number]               Age:   26 years     Sex:  Male     :  1995   Associated Diagnoses:   ADHD   Author:   Rome Palma MD      Visit Information      Date of Service: 10/25/2021 09:27 am  Performing Location: [facility]  Encounter#: [id number]      Primary Care Provider (PCP):  DANIS TAN      Referring Provider:  Rome Palma MD    NPI# [id number]      Chief Complaint   10/25/2021 9:45 AM CDT   establish care- Adderall refill        History of Present Illness   Has been taking Adderall since  dalila year of college. Takes Adderall XR 20mg in the morning and 5mg immediate release in afternoon  Graduated [employer] in [employer]. Worked at [employer] as . Quit job and moved to [place] and looking for a different kind of job.  Had prescriptions of Adderall from Dr Neno aMriano for about last year (viewed in [facility]). Previous with Dionne Alva.    Went to counseling for depression and anxiety. Symptoms are increased from all the change (quit job). Looking at a career change and going back to school (stressors)  Tried Lexapro with side effects (muted emotions, gained weight, decreased sex drives, lost interest in things)    Uses marijuana about once a month      Review of Systems   Respiratory:  No shortness of breath.    Cardiovascular:  No chest pain.    Eating and sleeping good  PHQ-9: 18pts (2021)  NIKO-7: 15 pts (2021)  MDQ: 7pts with minor problem (2021).   Has been seen by Psychiatrist in past and no diagnosis of bipolar  No significant suicidal thoughts (no plan)    Sleeps well and does not stay up all night. Sleeping more now  Mind races sometimes with anxiety      Health Status   Allergies:    Allergic Reactions (Selected)  Severity Not Documented  No known allergies (No reactions were documented)    Medications:  (Selected)   Documented Medications  Documented  Adderall 5 mg oral tablet: = 1 tab(s) ( 5 mg ), Oral, daily, Instructions: in the afternoon, 0 Refill(s), Type: Maintenance  Adderall XR 20 mg oral capsule, extended release: = 1 cap(s) ( 20 mg ), Oral, qam, # 90 cap(s), 0 Refill(s), Type: Maintenance  Multiple Vitamins oral tablet: 1 tab(s), Oral, daily, # 90 tab(s), 0 Refill(s), Type: Maintenance  ZyrTEC: daily, 0 Refill(s), Type: Maintenance  ibuprofen: 0 Refill(s), Type: Maintenance   Problem list:    All Problems  Myalgia and Myositis, Unspecified / ICD-9-.1 / Confirmed  Oral Aphthae / ICD-9-.2 / Confirmed  Other Malaise and Fatigue / ICD-9-.79 / Confirmed  Resolved: Pain in Joint Involving Lower Leg / ICD-9-.46  Resolved: Patellar Tendinitis / ICD-9-.64  Resolved: Unspecified Osteochondropathy / ICD-9-.9      Histories   Social History: Living with parents  Family History: Mom and Dad healthy. One brother healthy      Physical Examination   Vital Signs   10/25/2021 9:45 AM CDT Temperature Tympanic 97.7 DegF  LOW    Peripheral Pulse Rate 61 bpm    Pulse Site Radial artery    HR Method Electronic    Systolic Blood Pressure 110 mmHg    Diastolic Blood Pressure 64 mmHg    Mean Arterial Pressure 79 mmHg    BP Site Right arm    BP Method Manual    Oxygen Saturation 98 %      Measurements from flowsheet : Measurements   10/25/2021 9:45 AM CDT Height Measured - Standard 68 in    Weight Measured - Standard 169.5 lb    BSA 1.92 m2    Body Mass Index 25.77 kg/m2  HI      General:  Alert and oriented, No acute distress.    Respiratory:  Lungs are clear to auscultation.    Cardiovascular:  Normal rate, Regular rhythm.    Musculoskeletal:  Normal gait.    Psychiatric:  Cooperative, Appropriate mood & affect, Normal judgment.       Review / Management   Reviewed [place] [facility] October 2021      Impression and Plan   Diagnosis     ADHD (VOG46-US F90.9).        ADHD: refilled  "Adderall. Discussed marijuana use and needs to work on discontinuing it.   Anxiety and Depression: Declines medication. Will restart counseling (talking has helped in the past). Information given about book \"Redefining Anxiety\"  Substance agreement signed  "

## 2022-02-15 NOTE — NURSING NOTE
Generalized Anxiety Disorder Screening Entered On:  12/17/2021 2:35 PM CST    Performed On:  10/25/2021 2:34 PM CDT by Donan Sevilla               NIKO-7   NIKO Nervous, Anxious On Edge :   Nearly every day   NIKO Control Worrying B :   Nearly every day   NIKO Worrying Too Much :   More than half the days   NIKO Trouble Relaxing :   Nearly every day   NIKO Restless :   Several days   NIKO Easily Annoyed/Irritable :   More than half the days   NIKO Afraid :   Several days   NIKO Total Screening Score :   15    NIKO Difficulty with Work, Home, Others :   Somewhat difficult   Donna Sevilla - 12/17/2021 2:34 PM CST

## 2022-02-15 NOTE — TELEPHONE ENCOUNTER
---------------------  From: Yary Olmedo LPN (Phone Messages Pool ([id number]_[facility]))   To: [facility] Message Pool ([address]_[place]);     Sent: 10/27/2021 1:56:28 PM CDT  Subject: General Message     Phone Message    PCP:   asked for [facility]      Time of Call:  1:45pm       Person Calling:  pt  Phone number:  [phone]    Note:   Pt calling stating [facility] had sent a Rx on Monday to pharmacy for Adderall XR and IR. Pt says the pharmacy says they have reached out to [facility] with no response and told pt to reach out.    Pt is unsure if it is related to PA/med not covered.   Have you received fax from [facility] at all?    Last office visit and reason:  10/25/21 Establish care---------------------  From: Christopher Willingham LPN ([facility] Message Pool ([id number]_[facility]))   To: Rome Palma MD;     Sent: 10/27/2021 4:15:51 PM CDT  Subject: FW: General Message     Contacted Pharmacy and adderall not covered, Must try vyvannse, focalin, or concerta first.  Pt hasnt been on this med prior with [place] Medicaid.---------------------  From: Rome Palma MD   To: Christopher Willingham LPN;     Sent: 10/27/2021 4:36:29 PM CDT  Subject: RE: General Message     Please call [facility] and find out the issue. He is willing to pay out of pocket for generic adderall---------------------  From: Christopher Willingham LPN   To: [facility] Message Pool ([address]_[place]);     Sent: 10/27/2021 6:08:10 PM CDT  Subject: FW: General Message     According to pharmacy, before the patient can pay for the med out of pocket, a prior auth must be completed and denied by [place] medicaid.  Then the patient may pay for the med himself.PA form filled out for both doses. GJM to sign then will fax to [facility].PA faxed to [facility]. Note sent to them that pt will pay out of pocket once PA denied.

## 2022-02-15 NOTE — TELEPHONE ENCOUNTER
---------------------  From: Cinthia Rodrigues RN (Phone Messages Pool ([id number]_[facility]))   To: [facility] Message Pool ([address]_[place]);     Sent: 12/14/2021 9:17:24 AM CST  Subject: Adderall Refill       PCP:   VANESSA      Time of Call:  0915       Person Calling:  Pt  Phone number:  [phone]    Note:   Pt calling stating that he is in need of refill of both adderall prescriptions.  Advised that GJM is OC today but will be back tomorrow.  Pt thought he had an appointment scheduled for December but was informed that he does not have one set up.  Asked pt if I could transfer him to [facility] to get something set up but pt denied.      Pt states that he had issues with the [facility] in [facility] so  he went to [facility] to  the last prescription.    Last filled 10/28/21  5mg and 20mg XR, 30 caps each.  No return to clinic in chart.    Last office visit and reason:  [facility] Visit 11/16/21---------------------  From: Cora Malin CMA ([facility] Message Pool ([address]_[facility]))   To: Rome Palma MD;     Sent: 12/15/2021 9:16:28 AM CST  Subject: FW: Adderall Refill---------------------  From: Rome Palma MD   To: Cora Malin CMA;     Sent: 12/15/2021 10:50:48 AM CST  Subject: RE: Adderall Refill     One month sent to [facility] pharmacy and needs to be seen for further refills in clinicCalled and LVM notifying pt and to call back to make apt in clinic with GJMPt calling back stating that the pharmacy is telling him that a PA needs to be completed.  Advised that GJM is OC until Wednesday.  He says the PA was sent over but I do not see anything in the chart.  Pt states that he might have to use [facility] to  his prescriptions this time because he has been rationing them and they are almost gone.called [facility] and they stated that patient had picked up both scripts and used a discount card, pharmacy did state that both medications require a  PA they will fax over form to complete, confirmed fax  arron Malin, CMACalled insurance at [phone] and spoke with pharmacy, they stated that pt needs to try and fail on Vyvanse and Methylphenidate, before Adderall would be covered, Adderall PA was denied. Will discuss further at upcoming apt.

## 2022-02-15 NOTE — TELEPHONE ENCOUNTER
---------------------  From: Christopher Willingham LPN   To: [facility] Message Pool ([id number]_[place]);     Sent: 10/28/2021 12:42:08 PM CDT !  Subject: Medication      Phone message    PCP:   Sherly MENDEZ      Time of Call:  _1235       Person Calling:  _ Self  Phone number:  _     Returned call at: _    Note:   _  Pt called and said that despite the PA being denied, [facility] will still not fill the scripts even if he pays oop.  Pt is traveling today to [place] for the weekend.  He is asking if we can send the scripts to the [facility] pharmacy he uses down there, without insurance info.  He can then use a [employer] code to get a better price.  He says he has done this before without issue.      Last office visit and reason:  _---------------------  From: Janelle Schmidt CMA ([facility] Pool ([address]_[facility]))   To: Rome Palma MD;     Sent: 10/28/2021 1:21:30 PM CDT  Subject: FW: Medication      [place] pharmacy is in chart if you approve.---------------------  From: Rome Palma MD   To: [facility] ([address]_[place]);     Sent: 10/28/2021 1:44:32 PM CDT  Subject: RE: Medication      DonePt notified.

## 2022-02-15 NOTE — NURSING NOTE
Comprehensive Intake Entered On:  10/25/2021 9:53 AM CDT    Performed On:  10/25/2021 9:45 AM CDT by Josseline Sam               Summary   Chief Complaint :   establish care- Adderall refill    Weight Measured :   169.5 lb(Converted to: 169 lb 8 oz, 76.884 kg)    Height Measured :   68 in(Converted to: 5 ft 8 in, 172.72 cm)    Body Mass Index :   25.77 kg/m2 (HI)    Body Surface Area :   1.92 m2   Systolic Blood Pressure :   110 mmHg   Diastolic Blood Pressure :   64 mmHg   Mean Arterial Pressure :   79 mmHg   Peripheral Pulse Rate :   61 bpm   BP Site :   Right arm   Pulse Site :   Radial artery   BP Method :   Manual   HR Method :   Electronic   Temperature Tympanic :   97.7 DegF(Converted to: 36.5 DegC)  (LOW)    Oxygen Saturation :   98 %   Josseline Sam - 10/25/2021 9:45 AM CDT   Health Status   Allergies Verified? :   Yes   Medication History Verified? :   Yes   Medical History Verified? :   Yes   Pre-Visit Planning Status :   Completed   Tobacco Use? :   Never smoker   Josseline Sam - 10/25/2021 9:45 AM CDT   Consents   Consent for Immunization Exchange :   Consent Granted   Consent for Immunizations to Providers :   Consent Granted   Josseline Sam - 10/25/2021 9:45 AM CDT   Meds / Allergies   (As Of: 10/25/2021 9:53:47 AM CDT)   Allergies (Active)   No known allergies  Estimated Onset Date:   Unspecified ; Created By:   Generated Domain User for [id number]; Reaction Status:   Active ; Substance:   No known allergies ; Updated By:   Generated Domain User for [id number]; Reviewed Date:   10/25/2021 9:47 AM CDT        Medication List   (As Of: 10/25/2021 9:53:47 AM CDT)   Home Meds    cetirizine  :   cetirizine ; Status:   Documented ; Ordered As Mnemonic:   ZyrTEC ; Simple Display Line:   daily, 0 Refill(s) ; Catalog Code:   cetirizine ; Order Dt/Tm:   10/25/2021 9:49:15 AM CDT          multivitamin  :   multivitamin ; Status:   Documented ; Ordered As Mnemonic:   Multiple Vitamins oral tablet ; Simple  Display Line:   1 tab(s), Oral, daily, 90 tab(s), 0 Refill(s) ; Catalog Code:   multivitamin ; Order Dt/Tm:   10/25/2021 9:49:29 AM CDT          amphetamine-dextroamphetamine  :   amphetamine-dextroamphetamine ; Status:   Documented ; Ordered As Mnemonic:   Adderall 5 mg oral tablet ; Simple Display Line:   5 mg, 1 tab(s), Oral, daily, in the afternoon, 0 Refill(s) ; Catalog Code:   amphetamine-dextroamphetamine ; Order Dt/Tm:   10/25/2021 9:48:50 AM CDT          amphetamine-dextroamphetamine  :   amphetamine-dextroamphetamine ; Status:   Documented ; Ordered As Mnemonic:   Adderall XR 20 mg oral capsule, extended release ; Simple Display Line:   20 mg, 1 cap(s), Oral, qam, 90 cap(s), 0 Refill(s) ; Catalog Code:   amphetamine-dextroamphetamine ; Order Dt/Tm:   10/25/2021 9:48:40 AM CDT          ibuprofen  :   ibuprofen ; Status:   Documented ; Ordered As Mnemonic:   ibuprofen ; Simple Display Line:   0 Refill(s) ; Catalog Code:   ibuprofen ; Order Dt/Tm:   10/25/2021 9:48:59 AM CDT          acyclovir  :   acyclovir ; Status:   Completed ; Ordered As Mnemonic:   ACYCLOVIR ([id number]) ; Simple Display Line:   UNKNOWNUNIT, Not Listed ; Catalog Code:   acyclovir ; Order Dt/Tm:   7/11/2012 4:45:00 PM CDT            Social History   Social History   (As Of: 10/25/2021 9:53:47 AM CDT)   Tobacco:        Never (less than 100 in lifetime)   (Last Updated: 10/25/2021 9:53:36 AM CDT by Josseline Sam)          Electronic Cigarette/Vaping:        Electronic Cigarette Use: Never.   (Last Updated: 10/25/2021 9:53:43 AM CDT by Josseline Sam)

## 2022-03-02 VITALS
DIASTOLIC BLOOD PRESSURE: 66 MMHG | TEMPERATURE: 97 F | OXYGEN SATURATION: 99 % | BODY MASS INDEX: 25.28 KG/M2 | WEIGHT: 166.8 LBS | SYSTOLIC BLOOD PRESSURE: 112 MMHG | HEART RATE: 60 BPM | HEIGHT: 68 IN

## 2022-03-02 NOTE — NURSING NOTE
Comprehensive Intake Entered On:  1/17/2022 7:34 AM CST    Performed On:  1/17/2022 7:30 AM CST by Cora Malin CMA               Summary   Chief Complaint :   ADHD medication check    Weight Measured :   166.8 lb(Converted to: 166 lb 13 oz, 75.659 kg)    Height Measured :   68 in(Converted to: 5 ft 8 in, 172.72 cm)    Body Mass Index :   25.36 kg/m2 (HI)    Body Surface Area :   1.9 m2   Systolic Blood Pressure :   112 mmHg   Diastolic Blood Pressure :   66 mmHg   Mean Arterial Pressure :   81 mmHg   Peripheral Pulse Rate :   60 bpm   BP Site :   Right arm   Pulse Site :   Radial artery   BP Method :   Manual   HR Method :   Electronic   Temperature Tympanic :   97 DegF(Converted to: 36.1 DegC)  (LOW)    Oxygen Saturation :   99 %   Cora Malin CMA - 1/17/2022 7:30 AM CST   Health Status   Allergies Verified? :   Yes   Medication History Verified? :   Yes   Medical History Verified? :   Yes   Pre-Visit Planning Status :   Completed   Tobacco Use? :   Never smoker   Cora Malin CMA - 1/17/2022 7:30 AM CST   Consents   Consent for Immunization Exchange :   Consent Granted   Consent for Immunizations to Providers :   Consent Granted   Cora Malin CMA - 1/17/2022 7:30 AM CST   Meds / Allergies   (As Of: 1/17/2022 7:34:14 AM CST)   Allergies (Active)   No known allergies  Estimated Onset Date:   Unspecified ; Created By:   Generated Domain User for [id number]; Reaction Status:   Active ; Substance:   No known allergies ; Updated By:   Generated Domain User for [id number]; Reviewed Date:   1/17/2022 7:32 AM CST        Medication List   (As Of: 1/17/2022 7:34:14 AM CST)   Prescription/Discharge Order    amphetamine-dextroamphetamine  :   amphetamine-dextroamphetamine ; Status:   Prescribed ; Ordered As Mnemonic:   Adderall 5 mg oral tablet ; Simple Display Line:   5 mg, 1 tab(s), Oral, daily, in the afternoon  F90.9, 30 tab(s), 0 Refill(s) ; Ordering Provider:   Rome Palma MD; Catalog Code:    amphetamine-dextroamphetamine ; Order Dt/Tm:   12/15/2021 10:48:44 AM CST          amphetamine-dextroamphetamine  :   amphetamine-dextroamphetamine ; Status:   Prescribed ; Ordered As Mnemonic:   Adderall XR 20 mg oral capsule, extended release ; Simple Display Line:   20 mg, 1 cap(s), Oral, qam, F90.9, 30 cap(s), 0 Refill(s) ; Ordering Provider:   Rome Palma MD; Catalog Code:   amphetamine-dextroamphetamine ; Order Dt/Tm:   12/15/2021 10:48:57 AM CST            Home Meds    cetirizine  :   cetirizine ; Status:   Documented ; Ordered As Mnemonic:   ZyrTEC ; Simple Display Line:   daily, 0 Refill(s) ; Catalog Code:   cetirizine ; Order Dt/Tm:   10/25/2021 9:49:15 AM CDT          ibuprofen  :   ibuprofen ; Status:   Documented ; Ordered As Mnemonic:   ibuprofen ; Simple Display Line:   0 Refill(s) ; Catalog Code:   ibuprofen ; Order Dt/Tm:   10/25/2021 9:48:59 AM CDT          multivitamin  :   multivitamin ; Status:   Documented ; Ordered As Mnemonic:   Multiple Vitamins oral tablet ; Simple Display Line:   1 tab(s), Oral, daily, 90 tab(s), 0 Refill(s) ; Catalog Code:   multivitamin ; Order Dt/Tm:   10/25/2021 9:49:29 AM CDT            Social History   Social History   (As Of: 1/17/2022 7:34:14 AM CST)   Alcohol:        Beer (12 oz), Wine (5 oz), Liquor (Hard) (1.5 oz), 3-5 times per week, 2 drinks/episode average.  3 drinks/episode maximum.  Ready to change: No.   (Last Updated: 12/17/2021 2:27:23 PM CST by Donna Sevilla)          Tobacco:        Never (less than 100 in lifetime)   (Last Updated: 10/25/2021 9:53:36 AM CDT by Josseline Sam)          Electronic Cigarette/Vaping:        Electronic Cigarette Use: Never.   (Last Updated: 10/25/2021 9:53:43 AM CDT by Josseline Sam)          Substance Abuse:        Current, Marijuana, 1-2 times per week, Ready to change: No.   (Last Updated: 12/17/2021 2:28:13 PM CST by Donna Sevilla)          Employment/School:        Unemployed   (Last Updated:  12/17/2021 2:28:26 PM CST by Donna Sevilla)          Home/Environment:        Marital status: Single.  0 children.  Living situation: Home/Independent.  Injuries/Abuse/Neglect in household: No.  Feels unsafe at home: No.  Family/Friends available for support: Yes.   (Last Updated: 12/17/2021 2:29:08 PM CST by Donna Sevilla)          Nutrition/Health:        Type of diet: Regular.  Wants to lose weight: No.  Sleeping concerns: Yes.  Feels highly stressed: Yes.   (Last Updated: 12/17/2021 2:30:11 PM CST by Donna Sevilla)          Exercise:        Exercise frequency: Daily.  Exercise type: Weight lifting, Cardio and Tennis.   (Last Updated: 12/17/2021 2:31:04 PM CST by Donna Sevilla)          Sexual:        Sexually active: Yes.  Identifies as male, Sexual orientation: Straight or heterosexual.  History of STD: No.  Uses condoms: Yes.  Contraceptive Use Details: Condoms.  History of sexual abuse: No.   (Last Updated: 12/17/2021 2:31:34 PM CST by Donna Sevilla)

## 2022-03-02 NOTE — NURSING NOTE
Generalized Anxiety Disorder Screening Entered On:  1/18/2022 10:05 AM CST    Performed On:  1/17/2022 10:05 AM CST by Donna Sevilla               NIKO-7   NIKO Nervous, Anxious On Edge :   More than half the days   NIKO Control Worrying B :   More than half the days   NIKO Worrying Too Much :   More than half the days   NIKO Trouble Relaxing :   Nearly every day   NIKO Restless :   Not at all   NIKO Easily Annoyed/Irritable :   Several days   INKO Afraid :   Nearly every day   NIKO Total Screening Score :   13    Donna Sevilla - 1/18/2022 10:05 AM CST

## 2022-03-02 NOTE — TELEPHONE ENCOUNTER
---------------------  From: Nydia Lopez   To: Rome Palma MD;     Sent: 1/3/2022 11:01:09 AM CST  Subject: Scheduling Management     Patient no showed appointment. Appointment was for a Rx Refill.Noted

## 2022-03-02 NOTE — NURSING NOTE
Depression Screening Entered On:  1/18/2022 10:07 AM CST    Performed On:  1/17/2022 10:06 AM CST by Donna Sevilla               Depression Screening   Little Interest - Pleasure in Activities :   More than half the days   Feeling Down, Depressed, Hopeless :   More than half the days   Initial Depression Screen Score :   4 Score   Poor Appetite or Overeating :   Several days   Trouble Falling or Staying Asleep :   Nearly every day   Feeling Tired or Little Energy :   Nearly every day   Feeling Bad About Yourself :   More than half the days   Trouble Concentrating :   More than half the days   Moving or Speaking Slowly :   Not at all   Thoughts Better Off Dead or Hurting Self :   Several days   Difficulty at Work, Home, Getting Along :   Very difficult   Detailed Depression Screen Score :   12    Total Depression Screen Score :   16    Donna Sevilla - 1/18/2022 10:06 AM CST

## 2022-03-02 NOTE — LETTER
(Inserted Image. Unable to display)   [address] [place] [address]  January 24, 2022      JOSHUA MILLAN      [facility] [place] [place] [address]        Dear JOSHUA,    Thank you for selecting [facility] for your healthcare needs.  Below you will find the results of your recent tests done at our clinic.     Urine negative for marijuana      Result Name Current Result Previous Result Reference Range   U Cannabis Metabolite Scrn (ng/mL)  NEGATIVE 1/17/2022 ((A)) POSITIVE 10/25/2021  - <20       Please contact me or my assistant at [phone] if you have any questions about your results.     Sincerely,        Rome Palma MD        What do your labs mean?  Below is a glossary of commonly ordered labs:  LDL   Bad Cholesterol   HDL   Good Cholesterol  AST/ALT   Liver Function   Cr/Creatinine   Kidney Function  Microalbumin   Kidney Function  BUN   Kidney Function  PSA   Prostate    TSH   Thyroid Hormone  HgbA1c   Diabetes Test   Hgb (Hemoglobin)   Red Blood Cells

## 2022-03-02 NOTE — PROGRESS NOTES
Patient:   JOSHUA MILLAN            MRN: [id number]            FIN: [id number]               Age:   26 years     Sex:  Male     :  1995   Associated Diagnoses:   ADHD   Author:   Rome Palma MD      Visit Information      Date of Service: 2022 07:26 am  Performing Location: [facility]  Encounter#: [id number]      Primary Care Provider (PCP):  DANIS TAN    NPI# [id number]      Referring Provider:  Rome Palma MD    NPI# [id number]      Chief Complaint   2022 7:30 AM CST    ADHD medication check        History of Present Illness   Insurance requests switching to Vyvanse from Adderall.     Had been taking Adderall since  dalila year of college. 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 Worked at [employer] as . Quit job and moved to [place] and looking for a different kind of job.  Had prescriptions of Adderall from Dr Neno Mariano for about last year (viewed in [facility]). Previous with Dionne Alva.  Went to counseling for depression and anxiety. Symptoms are increased from all the change (quit job). Looking at a career change and going back to school (stressors)  Tried Lexapro with side effects (muted emotions, gained weight, decreased sex drives, lost interest in things)    Had been using marijuana once a month but stopped it      Review of Systems   Respiratory:  No shortness of breath.    Cardiovascular:  No chest pain.    Eating and sleeping good  PHQ-9: 16pts (2022). 18pts (2021)  NIKO-7: 15 pts (2022). 15 pts (2021)  MDQ: 7pts with minor problem (2021).   Has been seen by Psychiatrist in past and no diagnosis of bipolar  No significant suicidal thoughts (no plan)    Sleeps well and does not stay up all night. Sleeping more now  Mind races sometimes with anxiety       Health Status   Allergies:    Allergic Reactions (Selected)  Severity Not Documented  No known allergies (No reactions were documented)   Medications:  (Selected)   Prescriptions  Prescribed  Adderall 5 mg oral tablet: = 1 tab(s) ( 5 mg ), Oral, daily, Instructions: in the afternoon  F90.9, # 30 tab(s), 0 Refill(s), Type: Maintenance, Pharmacy: [facility]/pharmacy #[id number], 1 tab(s) Oral daily,Instr:in the afternoon; F90.9, 68, in, 10/25/21 9:45:00 CDT, Height Measured, 169.5...  Adderall XR 20 mg oral capsule, extended release: = 1 cap(s) ( 20 mg ), Oral, qam, Instructions: F90.9, # 30 cap(s), 0 Refill(s), Type: Maintenance, Pharmacy: [facility]/pharmacy #[id number], 1 cap(s) Oral qam,Instr:F90.9, 68, in, 10/25/21 9:45:00 CDT, Height Measured, 169.5, lb, 10/25/21 9:45:00 CDT, Weight Concepción...  Documented Medications  Documented  Multiple Vitamins oral tablet: 1 tab(s), Oral, daily, # 90 tab(s), 0 Refill(s), Type: Maintenance  ZyrTEC: daily, 0 Refill(s), Type: Maintenance  ibuprofen: 0 Refill(s), Type: Maintenance   Problem list:    All Problems  ADHD / SNOMED CT [id number] / Confirmed  Myalgia and Myositis, Unspecified / ICD-9-.1 / Confirmed  Oral Aphthae / ICD-9-.2 / Confirmed  Other Malaise and Fatigue / ICD-9-.79 / Confirmed  Resolved: Pain in Joint Involving Lower Leg / ICD-9-.46  Resolved: Patellar Tendinitis / ICD-9-.64  Resolved: Unspecified Osteochondropathy / ICD-9-.9      Histories   Social History: Living with parents. Working at [employer] as a   Family History: Mom and Dad healthy. One brother healthy      Physical Examination   Vital Signs   1/17/2022 7:30 AM CST Temperature Tympanic 97 DegF  LOW    Peripheral Pulse Rate 60 bpm    Pulse Site Radial artery    HR Method Electronic    Systolic Blood Pressure 112 mmHg    Diastolic Blood Pressure 66 mmHg    Mean Arterial Pressure 81 mmHg    BP Site Right arm    BP Method Manual    Oxygen Saturation 99 %      Measurements from flowsheet  ": Measurements   1/17/2022 7:30 AM CST Height Measured - Standard 68 in    Weight Measured - Standard 166.8 lb    BSA 1.9 m2    Body Mass Index 25.36 kg/m2  HI      General:  Alert and oriented, No acute distress.    Respiratory:  Lungs are clear to auscultation.    Cardiovascular:  Normal rate, Regular rhythm.    Musculoskeletal:  Normal gait.    Psychiatric:  Cooperative, Appropriate mood & affect, Normal judgment.       Review / Management   Reviewed [place] PDMP January 2022      Impression and Plan   Diagnosis     ADHD (TWP93-RP F90.9).        ADHD: Switch to Vyvanse 40mg. Last adderal yesterday.  Anxiety and Depression: Declines medication. Restarted counseling (talking has helped in the past) but expensive. Information given about book \"Redefining Anxiety\"  Substance agreement signed October 2021  "

## 2022-05-05 ENCOUNTER — OFFICE VISIT (OUTPATIENT)
Dept: FAMILY MEDICINE | Facility: CLINIC | Age: 27
End: 2022-05-05
Payer: COMMERCIAL

## 2022-05-05 VITALS
HEIGHT: 68 IN | DIASTOLIC BLOOD PRESSURE: 71 MMHG | WEIGHT: 172 LBS | BODY MASS INDEX: 26.07 KG/M2 | SYSTOLIC BLOOD PRESSURE: 113 MMHG | HEART RATE: 60 BPM

## 2022-05-05 DIAGNOSIS — F98.8 ATTENTION DEFICIT DISORDER (ADD) WITHOUT HYPERACTIVITY: ICD-10-CM

## 2022-05-05 DIAGNOSIS — F32.A ANXIETY AND DEPRESSION: Primary | ICD-10-CM

## 2022-05-05 DIAGNOSIS — F41.9 ANXIETY AND DEPRESSION: Primary | ICD-10-CM

## 2022-05-05 PROCEDURE — 96127 BRIEF EMOTIONAL/BEHAV ASSMT: CPT | Performed by: FAMILY MEDICINE

## 2022-05-05 PROCEDURE — 99214 OFFICE O/P EST MOD 30 MIN: CPT | Performed by: FAMILY MEDICINE

## 2022-05-05 RX ORDER — LISDEXAMFETAMINE DIMESYLATE 40 MG/1
40 CAPSULE ORAL DAILY
Qty: 30 CAPSULE | Refills: 0 | Status: SHIPPED | OUTPATIENT
Start: 2022-05-05 | End: 2022-06-04

## 2022-05-05 RX ORDER — BUPROPION HYDROCHLORIDE 100 MG/1
100 TABLET, EXTENDED RELEASE ORAL 2 TIMES DAILY
Qty: 60 TABLET | Refills: 11 | Status: SHIPPED | OUTPATIENT
Start: 2022-05-05

## 2022-05-05 RX ORDER — LISDEXAMFETAMINE DIMESYLATE 40 MG/1
1 CAPSULE ORAL DAILY
COMMUNITY
Start: 2022-03-03 | End: 2022-08-22

## 2022-05-05 RX ORDER — LISDEXAMFETAMINE DIMESYLATE 40 MG/1
40 CAPSULE ORAL DAILY
Qty: 30 CAPSULE | Refills: 0 | Status: SHIPPED | OUTPATIENT
Start: 2022-06-05 | End: 2022-07-05

## 2022-05-05 ASSESSMENT — PATIENT HEALTH QUESTIONNAIRE - PHQ9
SUM OF ALL RESPONSES TO PHQ QUESTIONS 1-9: 17
10. IF YOU CHECKED OFF ANY PROBLEMS, HOW DIFFICULT HAVE THESE PROBLEMS MADE IT FOR YOU TO DO YOUR WORK, TAKE CARE OF THINGS AT HOME, OR GET ALONG WITH OTHER PEOPLE: VERY DIFFICULT
SUM OF ALL RESPONSES TO PHQ QUESTIONS 1-9: 17

## 2022-05-05 NOTE — PROGRESS NOTES
"  Assessment & Plan     Anxiety and depression  We will treat with bupropion we warned him of side effects  - buPROPion (WELLBUTRIN SR) 100 MG 12 hr tablet; Take 1 tablet (100 mg) by mouth 2 times daily    Attention deficit disorder (ADD) without hyperactivity  He like to resume the Vyvanse he notes that he did have some side effects and had stopped it he will touch base with us towards the end of the month.  He can be out of insurance at the end of the month   - lisdexamfetamine (VYVANSE) 40 MG capsule; Take 1 capsule (40 mg) by mouth daily  - lisdexamfetamine (VYVANSE) 40 MG capsule; Take 1 capsule (40 mg) by mouth daily      30 minutes time spent doing chart review, history and exam, documentation and further activities per the note       BMI:   Estimated body mass index is 26.15 kg/m  as calculated from the following:    Height as of this encounter: 1.727 m (5' 8\").    Weight as of this encounter: 78 kg (172 lb).       Depression Screening Follow Up    PHQ 5/5/2022   PHQ-9 Total Score 17   Q9: Thoughts of better off dead/self-harm past 2 weeks Several days   F/U: Thoughts of suicide or self-harm No   F/U: Safety concerns No   Some encounter information is confidential and restricted. Go to Review Flowsheets activity to see all data.                 Follow Up    Follow Up Actions Taken      Discussed the following ways the patient can remain in a safe environment:        No follow-ups on file.    Amilcar Hanson MD  [facility]    Subjective   Nahun is a 26 year old who presents for the following health issues patient is currently looking for a job he will be running out of [employer] at the end of the month.  Living in his parents basement.  He would like to get into genetic counseling.  No current suicidal thoughts or ideations but it has been a problem at times..  Long struggle with anxiety and depression as well as ADD.  Some issues with GI symptoms with his Vyvanse so he stopped " "it but he like to restart it.  He cannot afford counseling.  He has a girlfriend who lives in [place].    History of Present Illness       Mental Health Follow-up:                    Today's PHQ-9         PHQ-9 Total Score: 17  PHQ-9 Q9 Thoughts of better off dead/self-harm past 2 weeks :   (P) Several days  Thoughts of suicide or self harm: (P) No  Self-harm Plan:     Self-harm Action:       Safety concerns for self or others: (P) No    How difficult have these problems made it for you to do your work, take care of things at home, or get along with other people: Very difficult        Reason for visit:  Prescription matters  Symptom onset:  More than a month  Symptoms include:  N/A  Symptom intensity:  Mild  Symptom progression:  Staying the same  Had these symptoms before:  Yes  Has tried/received treatment for these symptoms:  Yes  Previous treatment was successful:  Yes  Prior treatment description:  ADHD medication    He eats 4 or more servings of fruits and vegetables daily.He consumes 1 sweetened beverage(s) daily.He exercises with enough effort to increase his heart rate 30 to 60 minutes per day.  He exercises with enough effort to increase his heart rate 6 days per week. He is missing 1 dose(s) of medications per week.  He is not taking prescribed medications regularly due to cost of medication, side effects and other.     Vyvanse has SE and does not feel as effective. Dry mouth, loss of appetite. Overeats when doesn't take. Makes crave alcohol.   Denies substance abuse right now          Review of Systems   Constitutional, HEENT, cardiovascular, pulmonary, gi and gu systems are negative, except as otherwise noted.      Objective    /71 (BP Location: Right arm, Patient Position: Sitting, Cuff Size: Adult Large)   Pulse 60   Ht 1.727 m (5' 8\")   Wt 78 kg (172 lb)   BMI 26.15 kg/m    Body mass index is 26.15 kg/m .  Physical Exam   Patient is alert in no acute distress                "

## 2022-05-06 ASSESSMENT — PATIENT HEALTH QUESTIONNAIRE - PHQ9: SUM OF ALL RESPONSES TO PHQ QUESTIONS 1-9: 17

## 2022-07-12 ENCOUNTER — OFFICE VISIT (OUTPATIENT)
Dept: FAMILY MEDICINE | Facility: CLINIC | Age: 27
End: 2022-07-12
Payer: COMMERCIAL

## 2022-07-12 VITALS
TEMPERATURE: 97.7 F | HEART RATE: 64 BPM | HEIGHT: 68 IN | SYSTOLIC BLOOD PRESSURE: 108 MMHG | DIASTOLIC BLOOD PRESSURE: 64 MMHG | WEIGHT: 168 LBS | BODY MASS INDEX: 25.46 KG/M2

## 2022-07-12 DIAGNOSIS — S42.002D CLOSED DISPLACED FRACTURE OF LEFT CLAVICLE WITH ROUTINE HEALING, UNSPECIFIED PART OF CLAVICLE, SUBSEQUENT ENCOUNTER: ICD-10-CM

## 2022-07-12 DIAGNOSIS — Z01.818 PREOPERATIVE EXAMINATION: Primary | ICD-10-CM

## 2022-07-12 PROBLEM — F90.9 ADHD: Status: ACTIVE | Noted: 2018-09-14

## 2022-07-12 PROCEDURE — U0003 INFECTIOUS AGENT DETECTION BY NUCLEIC ACID (DNA OR RNA); SEVERE ACUTE RESPIRATORY SYNDROME CORONAVIRUS 2 (SARS-COV-2) (CORONAVIRUS DISEASE [COVID-19]), AMPLIFIED PROBE TECHNIQUE, MAKING USE OF HIGH THROUGHPUT TECHNOLOGIES AS DESCRIBED BY CMS-2020-01-R: HCPCS | Performed by: FAMILY MEDICINE

## 2022-07-12 PROCEDURE — U0005 INFEC AGEN DETEC AMPLI PROBE: HCPCS | Performed by: FAMILY MEDICINE

## 2022-07-12 PROCEDURE — 99214 OFFICE O/P EST MOD 30 MIN: CPT | Performed by: FAMILY MEDICINE

## 2022-07-12 RX ORDER — OXYCODONE HYDROCHLORIDE 5 MG/1
TABLET ORAL
COMMUNITY
Start: 2022-07-02

## 2022-07-12 RX ORDER — ONDANSETRON 4 MG/1
8 TABLET, ORALLY DISINTEGRATING ORAL EVERY 8 HOURS PRN
COMMUNITY
Start: 2022-07-02

## 2022-07-12 ASSESSMENT — PATIENT HEALTH QUESTIONNAIRE - PHQ9
SUM OF ALL RESPONSES TO PHQ QUESTIONS 1-9: 13
10. IF YOU CHECKED OFF ANY PROBLEMS, HOW DIFFICULT HAVE THESE PROBLEMS MADE IT FOR YOU TO DO YOUR WORK, TAKE CARE OF THINGS AT HOME, OR GET ALONG WITH OTHER PEOPLE: VERY DIFFICULT
SUM OF ALL RESPONSES TO PHQ QUESTIONS 1-9: 13

## 2022-07-12 NOTE — LETTER
July 13, 2022      Nahun ANDREW [facility] [address]        Dear ,    We are writing to inform you of your test results.    Your test results fall within the expected range(s) or remain unchanged from previous results.  Please continue with current treatment plan.    Resulted Orders   Asymptomatic COVID-19 Virus (Coronavirus) by PCR Nose   Result Value Ref Range    SARS CoV2 PCR Negative Negative, Testing sent to reference lab. Results will be returned via unsolicited result      Comment:      NEGATIVE: SARS-CoV-2 (COVID-19) RNA not detected, presumed negative.    Narrative    Testing was performed using the hong SARS-CoV-2 assay on the hong  Kelkoo0 System. This test should be ordered for the detection of  SARS-CoV-2 in individuals who meet SARS-CoV-2 clinical and/or  epidemiological criteria. Test performance is unknown in asymptomatic  patients. This test is for in vitro diagnostic use under the FDA EUA  for laboratories certified under CLIA to perform high and/or moderate  complexity testing. This test has not been FDA cleared or approved. A  negative result does not rule out the presence of PCR inhibitors in  the specimen or target RNA in concentration below the limit of  detection for the assay. The possibility of a false negative should  be considered if the patient's recent exposure or clinical  presentation suggests COVID-19. This test was validated by the [facility]. This  laboratory is certified under the Clinical Laboratory Improvement  Amendments of 1988 (CLIA-88) as qualified to perform high and/or  moderate complexity laboratory testing.       If you have any questions or concerns, please call the clinic at the number listed above.       Sincerely,      Amilcar Hanson MD
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
## 2022-07-13 LAB — SARS-COV-2 RNA RESP QL NAA+PROBE: NEGATIVE

## 2022-08-22 DIAGNOSIS — F90.0 ATTENTION DEFICIT HYPERACTIVITY DISORDER (ADHD), PREDOMINANTLY INATTENTIVE TYPE: Primary | ICD-10-CM

## 2022-08-22 RX ORDER — LISDEXAMFETAMINE DIMESYLATE 40 MG/1
40 CAPSULE ORAL DAILY
Qty: 30 CAPSULE | Refills: 0 | Status: SHIPPED | OUTPATIENT
Start: 2022-08-22 | End: 2022-10-14

## 2022-08-22 NOTE — TELEPHONE ENCOUNTER
Reason for Call:  Medication or medication refill:    Do you use a [facility]?  Name of the pharmacy and phone number for the current request:  [facility]     Name of the medication requested: Vyvanse 40mg     Other request: none     Can we leave a detailed message on this number? YES    Phone number patient can be reached at: Home number on file [phone] (home)    Best Time: any     Call taken on 8/22/2022 at 12:53 PM by Isabelle Nova

## 2022-08-22 NOTE — TELEPHONE ENCOUNTER
Routing refill request to provider for review/approval because:  Drug not on the G refill protocol    Last Written Prescription Date:  1/17/2022  Last Fill Quantity: 30,  # refills: 0   Last office visit: 7/12/2022 with prescribing provider:     Future Office Visit:  None             Requested Prescriptions   Pending Prescriptions Disp Refills     VYVANSE 40 MG capsule 30 capsule 0     Sig: Take 1 capsule (40 mg) by mouth daily       There is no refill protocol information for this order          Patricia Lang RN 08/22/22 1:33 PM

## 2022-10-03 ENCOUNTER — HEALTH MAINTENANCE LETTER (OUTPATIENT)
Age: 27
End: 2022-10-03

## 2022-10-14 ENCOUNTER — TELEPHONE (OUTPATIENT)
Dept: FAMILY MEDICINE | Facility: CLINIC | Age: 27
End: 2022-10-14

## 2022-10-14 DIAGNOSIS — F90.0 ATTENTION DEFICIT HYPERACTIVITY DISORDER (ADHD), PREDOMINANTLY INATTENTIVE TYPE: ICD-10-CM

## 2022-10-14 RX ORDER — LISDEXAMFETAMINE DIMESYLATE 40 MG/1
40 CAPSULE ORAL DAILY
Qty: 30 CAPSULE | Refills: 0 | Status: SHIPPED | OUTPATIENT
Start: 2022-10-14 | End: 2022-11-03

## 2022-10-14 NOTE — TELEPHONE ENCOUNTER
Medication Question or Refill        What medication are you calling about (include dose and sig)?:   VYVANSE 40 MG capsule  40 mg, DAILY 0 ordered         Summary: Take 1 capsule (40 mg) by mouth daily, Disp-30 capsule, R-0, TRIP,           Controlled Substance Agreement on file:   CSA -- Patient Level:    CSA: None found at the patient level.       Who prescribed the medication?: Issa    Do you need a refill? Yes:     When did you use the medication last? today    Patient offered an appointment? no    Do you have any questions or concerns?  no    Preferred Pharmacy:     [id number] [address]  [address]  [facility] [address]  Phone: [phone] Fax: [phone]      Could we send this information to you in [facility] or would you prefer to receive a phone call?:   Patient would prefer a phone call   Okay to leave a detailed message?: Yes at Cell number on file:    Telephone Information:   Mobile [phone]

## 2022-10-20 ENCOUNTER — TELEPHONE (OUTPATIENT)
Dept: FAMILY MEDICINE | Facility: CLINIC | Age: 27
End: 2022-10-20

## 2022-10-20 NOTE — TELEPHONE ENCOUNTER
Prior Authorization Retail Medication Request    Medication/Dose: VYVANSE 40 MG capsule  ICD code (if different than what is on RX):  Attention deficit hyperactivity disorder (ADHD), predominantly inattentive type [F90.0]   Previously Tried and Failed:    Rationale:      Insurance Name:    Insurance ID: [id number]    Pharmacy Information (if different than what is on RX)  Name:  [id number] - [address]  [address]  Phone:  [phone]

## 2022-10-20 NOTE — TELEPHONE ENCOUNTER
10-20-22  Pt called stated he has [employer] insurance and PA is required for:  VYVANSE 40 MG capsule  amirahyal

## 2022-10-20 NOTE — TELEPHONE ENCOUNTER
Central Prior Authorization Team  Phone: [phone]    PA Initiation    Medication: vyvanse 40 mg  Insurance Company: Comment:  [employer]  Pharmacy [place]illing the Rx: [id number] - [address]  [address]  Filling Pharmacy Phone: [phone]  Filling Pharmacy Fax:    Start Date: 10/20/2022

## 2022-10-21 NOTE — TELEPHONE ENCOUNTER
Central Prior Authorization Team - Phone: [phone]     PA Initiation    Medication: VYVANSE 40 MG capsule- PA INITIATED  Insurance Company:    Pharmacy [place]illing the Rx: [id number] - [address]  [address]  Filling Pharmacy Phone: [phone]  Filling Pharmacy Fax:    Start Date: 10/21/2022

## 2022-10-24 NOTE — TELEPHONE ENCOUNTER
Central Prior Authorization Team - Phone: [phone]     Prior Authorization Follow Up    Received additional clinical questions form via fax from [employer]. Completed form  And returned via fax. Waiting for determination.

## 2022-10-24 NOTE — TELEPHONE ENCOUNTER
Central Prior Authorization Team  Phone: [phone]    Prior Authorization Approval    Authorization Effective Date: 10/22/2022  Authorization Expiration Date: 10/22/2023  Medication:   Approved Dose/Quantity:   Reference #: [id number]   Insurance Company: Comment:  patricia  Expected CoPay:       CoPay Card Available:      Foundation Assistance Needed:    Which Pharmacy is filling the prescription (Not needed for infusion/clinic administered): [id number] - [address]  [address]  Pharmacy Notified: Yes  Patient Notified: Yes

## 2022-10-27 NOTE — TELEPHONE ENCOUNTER
Central Prior Authorization Team - Phone: [phone]     Prior Authorization Approval    Authorization Effective Date: 10/22/2022  Authorization Expiration Date: 10/22/2023  Medication: VYVANSE 40 MG capsule- PA APPROVED  Approved Dose/Quantity: 30  Reference #:     Insurance Company:    Expected CoPay:       CoPay Card Available:      Foundation Assistance Needed:    Which Pharmacy is filling the prescription (Not needed for infusion/clinic administered): [id number] - [address]  [address]  Pharmacy Notified: Yes  Patient Notified: Yes

## 2022-11-03 ENCOUNTER — TELEPHONE (OUTPATIENT)
Dept: FAMILY MEDICINE | Facility: CLINIC | Age: 27
End: 2022-11-03

## 2022-11-03 DIAGNOSIS — F90.2 ATTENTION DEFICIT HYPERACTIVITY DISORDER (ADHD), COMBINED TYPE: Primary | ICD-10-CM

## 2022-11-03 RX ORDER — DEXTROAMPHETAMINE SACCHARATE, AMPHETAMINE ASPARTATE MONOHYDRATE, DEXTROAMPHETAMINE SULFATE AND AMPHETAMINE SULFATE 5; 5; 5; 5 MG/1; MG/1; MG/1; MG/1
20 CAPSULE, EXTENDED RELEASE ORAL DAILY
Qty: 30 CAPSULE | Refills: 0 | Status: SHIPPED | OUTPATIENT
Start: 2022-11-03

## 2022-11-03 RX ORDER — DEXTROAMPHETAMINE SACCHARATE, AMPHETAMINE ASPARTATE, DEXTROAMPHETAMINE SULFATE AND AMPHETAMINE SULFATE 1.25; 1.25; 1.25; 1.25 MG/1; MG/1; MG/1; MG/1
5 TABLET ORAL 2 TIMES DAILY
Qty: 30 TABLET | Refills: 0 | Status: SHIPPED | OUTPATIENT
Start: 2022-11-03

## 2022-11-03 NOTE — TELEPHONE ENCOUNTER
11-3-22  Pt called stated hes currently taking:  VYVANSE 40 MG capsule  Pt is now requesting to switch back to adderall 20mg & 5mg booster.(dr kirby prescribed long time ago)  Per pt his insurance [employer] doesn't cover vyvanse anymore and the cost is $360.00 & pt states vyvanse doesn't work as well as the adderrall does.  Pt uses pharmcy:  [facility]-   [address]  [place], [place]   Per pt the adderall will require a PA as well   daniel

## 2022-11-07 ENCOUNTER — TELEPHONE (OUTPATIENT)
Dept: FAMILY MEDICINE | Facility: CLINIC | Age: 27
End: 2022-11-07

## 2022-11-07 NOTE — TELEPHONE ENCOUNTER
PA Initiation    Medication: amphetamine-dextroamphetamine (ADDERALL XR) 20 MG 24 hr capsule  Insurance Company: Other (see comments)Comment:  [facility] [place]  Pharmacy Filling the Rx: [id number] - [address]  [address]  Filling Pharmacy Phone: [phone]  Filling Pharmacy Fax: [phone]  Start Date: 11/7/2022    Initiated PA over the phone- insurance will fax over form to fill out.

## 2022-11-07 NOTE — TELEPHONE ENCOUNTER
Prior Authorization Approval    Authorization Effective Date: 11/7/2022  Authorization Expiration Date: 11/7/2023  Medication: amphetamine-dextroamphetamine (ADDERALL) 5 MG tablet  Approved Dose/Quantity:   Reference #:     Insurance Company: Other (see comments)Comment:  [employer]  Which Pharmacy is filling the prescription (Not needed for infusion/clinic administered): [id number] - [address]  [address]  Pharmacy Notified: Yes  Patient Notified: Yes

## 2022-11-07 NOTE — TELEPHONE ENCOUNTER
Prior Authorization Approval    Authorization Effective Date: 11/7/2022  Authorization Expiration Date: 11/7/2023  Medication: amphetamine-dextroamphetamine (ADDERALL XR) 20 MG 24 hr capsule  Approved Dose/Quantity:   Reference #:     Insurance Company: Other (see comments)Comment:  Avinash  Which Pharmacy is filling the prescription (Not needed for infusion/clinic administered): [id number] [address]  [address]  Pharmacy Notified: Yes  Patient Notified: Yes

## 2022-11-07 NOTE — TELEPHONE ENCOUNTER
PA Initiation    Medication: amphetamine-dextroamphetamine (ADDERALL) 5 MG tablet  Insurance Company: Other (see comments)Comment:  [facility]  [place] the [id number] [address]  [address]  Filling Pharmacy Phone: [phone]  Filling Pharmacy Fax: [phone]  Start Date: 11/7/2022    Initiated PA over the phone, Avinash will fax over form

## 2023-02-11 ENCOUNTER — HEALTH MAINTENANCE LETTER (OUTPATIENT)
Age: 28
End: 2023-02-11

## 2024-03-09 ENCOUNTER — HEALTH MAINTENANCE LETTER (OUTPATIENT)
Age: 29
End: 2024-03-09

## 2025-03-16 ENCOUNTER — HEALTH MAINTENANCE LETTER (OUTPATIENT)
Age: 30
End: 2025-03-16